# Patient Record
Sex: MALE | Race: WHITE | NOT HISPANIC OR LATINO | Employment: FULL TIME | URBAN - METROPOLITAN AREA
[De-identification: names, ages, dates, MRNs, and addresses within clinical notes are randomized per-mention and may not be internally consistent; named-entity substitution may affect disease eponyms.]

---

## 2024-07-21 ENCOUNTER — HOSPITAL ENCOUNTER (EMERGENCY)
Facility: HOSPITAL | Age: 39
Discharge: HOME | End: 2024-07-21
Attending: EMERGENCY MEDICINE
Payer: MEDICARE

## 2024-07-21 VITALS
SYSTOLIC BLOOD PRESSURE: 126 MMHG | DIASTOLIC BLOOD PRESSURE: 80 MMHG | RESPIRATION RATE: 20 BRPM | BODY MASS INDEX: 28.49 KG/M2 | OXYGEN SATURATION: 97 % | HEIGHT: 73 IN | HEART RATE: 92 BPM | WEIGHT: 215 LBS | TEMPERATURE: 98.2 F

## 2024-07-21 DIAGNOSIS — M19.90 INFLAMMATORY ARTHRITIS: Primary | ICD-10-CM

## 2024-07-21 PROCEDURE — 99283 EMERGENCY DEPT VISIT LOW MDM: CPT | Performed by: EMERGENCY MEDICINE

## 2024-07-21 RX ORDER — METHYLPREDNISOLONE 4 MG/1
TABLET ORAL
Qty: 21 TABLET | Refills: 0 | Status: SHIPPED | OUTPATIENT
Start: 2024-07-21 | End: 2024-07-21 | Stop reason: WASHOUT

## 2024-07-21 RX ORDER — PREDNISONE 50 MG/1
50 TABLET ORAL DAILY
Qty: 6 TABLET | Refills: 0 | Status: SHIPPED | OUTPATIENT
Start: 2024-07-21 | End: 2024-07-27

## 2024-07-21 ASSESSMENT — LIFESTYLE VARIABLES
HAVE YOU EVER FELT YOU SHOULD CUT DOWN ON YOUR DRINKING: NO
EVER FELT BAD OR GUILTY ABOUT YOUR DRINKING: NO
TOTAL SCORE: 0
HAVE PEOPLE ANNOYED YOU BY CRITICIZING YOUR DRINKING: NO
EVER HAD A DRINK FIRST THING IN THE MORNING TO STEADY YOUR NERVES TO GET RID OF A HANGOVER: NO

## 2024-07-21 ASSESSMENT — COLUMBIA-SUICIDE SEVERITY RATING SCALE - C-SSRS
1. IN THE PAST MONTH, HAVE YOU WISHED YOU WERE DEAD OR WISHED YOU COULD GO TO SLEEP AND NOT WAKE UP?: NO
6. HAVE YOU EVER DONE ANYTHING, STARTED TO DO ANYTHING, OR PREPARED TO DO ANYTHING TO END YOUR LIFE?: NO
2. HAVE YOU ACTUALLY HAD ANY THOUGHTS OF KILLING YOURSELF?: NO

## 2024-07-21 ASSESSMENT — PAIN SCALES - GENERAL
PAINLEVEL_OUTOF10: 3
PAINLEVEL_OUTOF10: 3

## 2024-07-21 ASSESSMENT — PAIN - FUNCTIONAL ASSESSMENT: PAIN_FUNCTIONAL_ASSESSMENT: 0-10

## 2024-07-21 NOTE — ED PROVIDER NOTES
"HPI   Chief Complaint   Patient presents with    Knee Pain     X2-3 days of R knee \"locking up\"; pt endorsing R calf pain        Patient is a 38-year-old male with no segment past medical history presenting [facility] for right knee pain.  Patient reports that Thursday night began having spontaneously, difficulty fully extending and fully flexing his right knee.  Patient denies any recent inciting injury.  Patient denies any recent fever, chills, joint swelling, development of numbness/tingling of any of his extremities.  Remaining review of system, otherwise unremarkable.  Patient denies any heavy lifting or recent strenuous activity at work or at home.              Patient History   History reviewed. No pertinent past medical history.  History reviewed. No pertinent surgical history.  No family history on file.  Social History     Tobacco Use    Smoking status: Never    Smokeless tobacco: Never   Vaping Use    Vaping status: Never Used   Substance Use Topics    Alcohol use: Never    Drug use: Never       Physical Exam   ED Triage Vitals [07/21/24 1850]   Temperature Heart Rate Respirations BP   36.8 °C (98.2 °F) (!) 103 18 147/82      Pulse Ox Temp src Heart Rate Source Patient Position   97 % -- -- --      BP Location FiO2 (%)     -- --       Physical Exam  Constitutional:       Appearance: Normal appearance. He is normal weight.   HENT:      Head: Normocephalic and atraumatic.      Nose: Nose normal.      Mouth/Throat:      Mouth: Mucous membranes are moist.      Pharynx: Oropharynx is clear.   Eyes:      Extraocular Movements: Extraocular movements intact.      Conjunctiva/sclera: Conjunctivae normal.      Pupils: Pupils are equal, round, and reactive to light.   Cardiovascular:      Rate and Rhythm: Normal rate and regular rhythm.      Pulses: Normal pulses.      Heart sounds: Normal heart sounds.   Pulmonary:      Effort: Pulmonary effort is normal.      Breath sounds: Normal breath sounds.   Abdominal: " "     General: Abdomen is flat. Bowel sounds are normal.      Palpations: Abdomen is soft.   Musculoskeletal:         General: Normal range of motion.      Cervical back: Normal range of motion and neck supple.      Comments: Decreased range of motion of the right knee during full extension and full flexion.   Skin:     General: Skin is warm and dry.      Capillary Refill: Capillary refill takes less than 2 seconds.   Neurological:      General: No focal deficit present.      Mental Status: He is alert and oriented to person, place, and time. Mental status is at baseline.   Psychiatric:         Mood and Affect: Mood normal.         Behavior: Behavior normal.           ED Course & MDM   Diagnoses as of 07/28/24 1350   Inflammatory arthritis                       No data recorded                        Medical Decision Making  Patient is a 38 y.o. male who presents to [facility] ED for Knee Pain (X2-3 days of R knee \"locking up\"; pt endorsing R calf pain ). On initial ED evaluation, patient found to be in no acute distress. Per HPI, concern to evaluate and treat for possible musculoskeletal injury versus inflammatory arthritis of the right knee.  Patient history, clinical exam provide low suspicion for musculoskeletal nature of injury.  Patient does have history of gout and his feet.  Patient symptomatology consistent with inflammatory arthritis.  No indication for imaging at this time.  Patient to be discharged on steroid course.  Patient given strict return precautions regarding worsening symptoms.  Patient amenable to plan.    Rx given for prednisone burst. Patient to follow up with PCP outpatient. Anticipatory guidance and return precautions provided.  Patient otherwise stable for discharge.          Procedure  Procedures     Chuy Lawson MD  Resident  07/21/24 1928      The patient was seen by the resident/fellow.  I have personally performed a substantive portion of the encounter.  I have seen and examined the " patient; agree with the workup, evaluation, MDM, management and diagnosis.  The care plan has been discussed with the resident; I have reviewed the resident’s note and agree with the documented findings.      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  The patient to follow-up with orthopedics as directed for further evaluation management, and use anti-inflammatories and Tylenol for any pain or inflammation as well as icing.  On examination, there is no calf swelling, no ankle swelling or foot swelling.  There is no tenderness along the deep vein system.  The calf strain happens with full extension.  Again this may correlate with the knee inflammation.  She is to return to the emergency room for any increasing pain, swelling, redness, fevers and chills, or any worsening concerning symptoms.     Tariq Parrish DO  07/28/24 8201

## 2024-07-21 NOTE — DISCHARGE INSTRUCTIONS
Please return to close ED if develop any worsening knee pain or other joint pains, fever, chills, numbness/tingling, or loss of function.